# Patient Record
Sex: FEMALE | Race: WHITE
[De-identification: names, ages, dates, MRNs, and addresses within clinical notes are randomized per-mention and may not be internally consistent; named-entity substitution may affect disease eponyms.]

---

## 2019-09-09 ENCOUNTER — HOSPITAL ENCOUNTER (EMERGENCY)
Dept: HOSPITAL 12 - ER | Age: 34
Discharge: HOME | End: 2019-09-09
Payer: COMMERCIAL

## 2019-09-09 VITALS — HEIGHT: 64 IN | BODY MASS INDEX: 25.61 KG/M2 | WEIGHT: 150 LBS

## 2019-09-09 DIAGNOSIS — J45.909: ICD-10-CM

## 2019-09-09 DIAGNOSIS — Z88.8: ICD-10-CM

## 2019-09-09 DIAGNOSIS — J03.90: Primary | ICD-10-CM

## 2019-09-09 DIAGNOSIS — Z88.0: ICD-10-CM

## 2019-09-09 DIAGNOSIS — Z79.899: ICD-10-CM

## 2019-09-09 PROCEDURE — A4663 DIALYSIS BLOOD PRESSURE CUFF: HCPCS

## 2019-09-09 NOTE — NUR
Patient discharged to home in stable conditon.  Written and verbal after care 
instructions given. 

Patient verbalizes understanding of instructions.pt accompanied by

## 2020-01-19 ENCOUNTER — HOSPITAL ENCOUNTER (EMERGENCY)
Dept: HOSPITAL 12 - ER | Age: 35
Discharge: HOME | End: 2020-01-19
Payer: COMMERCIAL

## 2020-01-19 VITALS — WEIGHT: 138 LBS | HEIGHT: 64 IN | BODY MASS INDEX: 23.56 KG/M2

## 2020-01-19 DIAGNOSIS — Z79.899: ICD-10-CM

## 2020-01-19 DIAGNOSIS — Z88.8: ICD-10-CM

## 2020-01-19 DIAGNOSIS — J45.909: ICD-10-CM

## 2020-01-19 DIAGNOSIS — Z88.0: ICD-10-CM

## 2020-01-19 DIAGNOSIS — N39.0: Primary | ICD-10-CM

## 2020-01-19 LAB
APPEARANCE UR: CLEAR
BILIRUB UR QL STRIP: NEGATIVE
COLOR UR: YELLOW
DEPRECATED SQUAMOUS URNS QL MICRO: (no result) /HPF
GLUCOSE UR STRIP-MCNC: NEGATIVE MG/DL
HCG UR QL: NEGATIVE
HGB UR QL STRIP: (no result)
KETONES UR STRIP-MCNC: NEGATIVE MG/DL
LEUKOCYTE ESTERASE UR QL STRIP: (no result)
NITRITE UR QL STRIP: NEGATIVE
PH UR STRIP: 7 [PH] (ref 5–8)
RBC #/AREA URNS HPF: (no result) /HPF (ref 0–3)
SP GR UR STRIP: 1.01 (ref 1–1.03)
UROBILINOGEN UR STRIP-MCNC: 0.2 E.U./DL
WBC #/AREA URNS HPF: (no result) /HPF
WBC #/AREA URNS HPF: (no result) /HPF (ref 0–3)

## 2020-01-19 PROCEDURE — A4663 DIALYSIS BLOOD PRESSURE CUFF: HCPCS

## 2020-01-22 ENCOUNTER — HOSPITAL ENCOUNTER (EMERGENCY)
Dept: HOSPITAL 12 - ER | Age: 35
Discharge: HOME | End: 2020-01-22
Payer: COMMERCIAL

## 2020-01-22 VITALS — DIASTOLIC BLOOD PRESSURE: 61 MMHG | SYSTOLIC BLOOD PRESSURE: 101 MMHG

## 2020-01-22 VITALS — BODY MASS INDEX: 23.9 KG/M2 | WEIGHT: 140 LBS | HEIGHT: 64 IN

## 2020-01-22 DIAGNOSIS — J10.1: Primary | ICD-10-CM

## 2020-01-22 DIAGNOSIS — Z79.899: ICD-10-CM

## 2020-01-22 DIAGNOSIS — Z88.0: ICD-10-CM

## 2020-01-22 DIAGNOSIS — J45.909: ICD-10-CM

## 2020-01-22 DIAGNOSIS — Z88.8: ICD-10-CM

## 2020-01-22 LAB
ALP SERPL-CCNC: 38 U/L (ref 50–136)
ALT SERPL W/O P-5'-P-CCNC: 16 U/L (ref 14–59)
APPEARANCE UR: CLEAR
AST SERPL-CCNC: 8 U/L (ref 15–37)
BASOPHILS # BLD AUTO: 0 K/UL (ref 0–8)
BASOPHILS NFR BLD AUTO: 0.5 % (ref 0–2)
BILIRUB DIRECT SERPL-MCNC: 0.1 MG/DL (ref 0–0.2)
BILIRUB SERPL-MCNC: 0.2 MG/DL (ref 0.2–1)
BILIRUB UR QL STRIP: NEGATIVE
BUN SERPL-MCNC: 8 MG/DL (ref 7–18)
CHLORIDE SERPL-SCNC: 99 MMOL/L (ref 98–107)
CO2 SERPL-SCNC: 30 MMOL/L (ref 21–32)
COLOR UR: YELLOW
CREAT SERPL-MCNC: 1 MG/DL (ref 0.6–1.3)
DEPRECATED SQUAMOUS URNS QL MICRO: (no result) /HPF
EOSINOPHIL # BLD AUTO: 0 K/UL (ref 0–0.7)
EOSINOPHIL NFR BLD AUTO: 0.1 % (ref 0–7)
GLUCOSE SERPL-MCNC: 121 MG/DL (ref 74–106)
GLUCOSE UR STRIP-MCNC: NEGATIVE MG/DL
HCT VFR BLD AUTO: 42 % (ref 31.2–41.9)
HGB BLD-MCNC: 14.1 G/DL (ref 10.9–14.3)
HGB UR QL STRIP: (no result)
KETONES UR STRIP-MCNC: NEGATIVE MG/DL
LEUKOCYTE ESTERASE UR QL STRIP: NEGATIVE
LYMPHOCYTES # BLD AUTO: 1.3 K/UL (ref 20–40)
LYMPHOCYTES NFR BLD AUTO: 18.9 % (ref 20.5–51.5)
MCH RBC QN AUTO: 29.5 UUG (ref 24.7–32.8)
MCHC RBC AUTO-ENTMCNC: 33 G/DL (ref 32.3–35.6)
MCV RBC AUTO: 88.4 FL (ref 75.5–95.3)
MONOCYTES # BLD AUTO: 0.8 K/UL (ref 2–10)
MONOCYTES NFR BLD AUTO: 12.2 % (ref 0–11)
NEUTROPHILS # BLD AUTO: 4.6 K/UL (ref 1.8–8.9)
NEUTROPHILS NFR BLD AUTO: 68.3 % (ref 38.5–71.5)
NITRITE UR QL STRIP: NEGATIVE
PH UR STRIP: 7 [PH] (ref 5–8)
PLATELET # BLD AUTO: 154 K/UL (ref 179–408)
POTASSIUM SERPL-SCNC: 4.6 MMOL/L (ref 3.5–5.1)
RBC # BLD AUTO: 4.76 MIL/UL (ref 3.63–4.92)
RBC #/AREA URNS HPF: (no result) /HPF (ref 0–3)
SP GR UR STRIP: 1.01 (ref 1–1.03)
UROBILINOGEN UR STRIP-MCNC: 0.2 E.U./DL
WBC # BLD AUTO: 6.7 K/UL (ref 3.8–11.8)
WBC #/AREA URNS HPF: (no result) /HPF
WBC #/AREA URNS HPF: (no result) /HPF (ref 0–3)
WS STN SPEC: 8 G/DL (ref 6.4–8.2)

## 2020-01-22 PROCEDURE — A4663 DIALYSIS BLOOD PRESSURE CUFF: HCPCS

## 2020-01-22 NOTE — NUR
VERIFIED THE FACT THAT THE PT TOOK MOTRIN EARLIER WHILE IT IS IN THE RECORD 
THAT THE PT HAS ALLERGY TO IBUPROFEN. PT MENTIONED THAT SHE TOOK SOME BABY DOSE 
OF MOTRIN BECAUSE SHE DID NOT KNOW WHAT ELSE TO DO.PT DENIES ANY ALLERGIC 
REACTION MD NOTIFIED.

## 2020-01-22 NOTE — NUR
Patient discharged to home in stable conditon.  Written and verbal after care 
instructions given. 

Patient verbalizes understanding of instructions.PT WALKS IN STEADY GAIT. PT 
GOING HOME WITH . PT SAYS FEELS BETTER.

## 2021-10-07 ENCOUNTER — HOSPITAL ENCOUNTER (EMERGENCY)
Dept: HOSPITAL 12 - ER | Age: 36
Discharge: HOME | End: 2021-10-07
Payer: COMMERCIAL

## 2021-10-07 VITALS — WEIGHT: 142 LBS | HEIGHT: 64 IN | BODY MASS INDEX: 24.24 KG/M2

## 2021-10-07 DIAGNOSIS — J06.9: Primary | ICD-10-CM

## 2021-10-07 DIAGNOSIS — J45.909: ICD-10-CM

## 2021-10-07 DIAGNOSIS — Z20.822: ICD-10-CM

## 2021-10-07 DIAGNOSIS — Z88.6: ICD-10-CM

## 2021-10-07 DIAGNOSIS — Z88.0: ICD-10-CM

## 2021-10-07 PROCEDURE — U0003 INFECTIOUS AGENT DETECTION BY NUCLEIC ACID (DNA OR RNA); SEVERE ACUTE RESPIRATORY SYNDROME CORONAVIRUS 2 (SARS-COV-2) (CORONAVIRUS DISEASE [COVID-19]), AMPLIFIED PROBE TECHNIQUE, MAKING USE OF HIGH THROUGHPUT TECHNOLOGIES AS DESCRIBED BY CMS-2020-01-R: HCPCS

## 2021-10-07 PROCEDURE — 99283 EMERGENCY DEPT VISIT LOW MDM: CPT

## 2021-10-07 PROCEDURE — A4663 DIALYSIS BLOOD PRESSURE CUFF: HCPCS
